# Patient Record
Sex: FEMALE | Race: WHITE | Employment: FULL TIME | URBAN - METROPOLITAN AREA
[De-identification: names, ages, dates, MRNs, and addresses within clinical notes are randomized per-mention and may not be internally consistent; named-entity substitution may affect disease eponyms.]

---

## 2017-07-28 ENCOUNTER — OFFICE VISIT (OUTPATIENT)
Dept: FAMILY MEDICINE CLINIC | Age: 37
End: 2017-07-28
Payer: COMMERCIAL

## 2017-07-28 VITALS
DIASTOLIC BLOOD PRESSURE: 80 MMHG | WEIGHT: 157.4 LBS | SYSTOLIC BLOOD PRESSURE: 110 MMHG | HEIGHT: 67 IN | OXYGEN SATURATION: 99 % | BODY MASS INDEX: 24.71 KG/M2 | RESPIRATION RATE: 16 BRPM | HEART RATE: 68 BPM

## 2017-07-28 DIAGNOSIS — J30.89 ENVIRONMENTAL AND SEASONAL ALLERGIES: ICD-10-CM

## 2017-07-28 DIAGNOSIS — F43.21 ADJUSTMENT DISORDER WITH DEPRESSED MOOD: ICD-10-CM

## 2017-07-28 PROCEDURE — 99213 OFFICE O/P EST LOW 20 MIN: CPT | Performed by: FAMILY MEDICINE

## 2017-07-28 RX ORDER — DESLORATADINE 5 MG/1
TABLET ORAL
Qty: 90 TABLET | Refills: 3 | Status: SHIPPED | OUTPATIENT
Start: 2017-07-28 | End: 2019-08-07 | Stop reason: SDUPTHER

## 2017-07-28 RX ORDER — BUPROPION HYDROCHLORIDE 150 MG/1
150 TABLET ORAL EVERY MORNING
Qty: 90 TABLET | Refills: 3 | Status: SHIPPED | OUTPATIENT
Start: 2017-07-28 | End: 2018-08-14 | Stop reason: SDUPTHER

## 2017-07-28 ASSESSMENT — ENCOUNTER SYMPTOMS
GASTROINTESTINAL NEGATIVE: 1
RESPIRATORY NEGATIVE: 1

## 2018-01-19 ENCOUNTER — HOSPITAL ENCOUNTER (OUTPATIENT)
Dept: WOMENS IMAGING | Age: 38
Discharge: HOME | End: 2018-01-19
Payer: COMMERCIAL

## 2018-01-19 ENCOUNTER — HOSPITAL ENCOUNTER (OUTPATIENT)
Dept: MRI IMAGING | Age: 38
Discharge: HOME | End: 2018-01-19
Payer: COMMERCIAL

## 2018-01-19 DIAGNOSIS — Z15.01 GENETIC SUSCEPTIBILITY TO MALIGNANT NEOPLASM OF BREAST: ICD-10-CM

## 2018-01-19 DIAGNOSIS — N60.19 FIBROCYSTIC BREAST DISEASE (FCBD), UNSPECIFIED LATERALITY: ICD-10-CM

## 2018-01-19 DIAGNOSIS — Z12.31 VISIT FOR SCREENING MAMMOGRAM: ICD-10-CM

## 2018-01-19 DIAGNOSIS — Z80.3 FAMILY HISTORY OF BREAST CANCER: ICD-10-CM

## 2018-01-19 PROCEDURE — 6360000004 HC RX CONTRAST MEDICATION: Performed by: NURSE PRACTITIONER

## 2018-01-19 PROCEDURE — A9579 GAD-BASE MR CONTRAST NOS,1ML: HCPCS | Performed by: NURSE PRACTITIONER

## 2018-01-19 PROCEDURE — 77067 SCR MAMMO BI INCL CAD: CPT

## 2018-01-19 PROCEDURE — 0159T MRI BREAST BILATERAL W WO CONTRAST: CPT

## 2018-01-19 RX ADMIN — GADOTERIDOL 15 ML: 279.3 INJECTION, SOLUTION INTRAVENOUS at 09:55

## 2018-03-01 DIAGNOSIS — J30.89 ENVIRONMENTAL AND SEASONAL ALLERGIES: ICD-10-CM

## 2018-03-01 RX ORDER — DESLORATADINE 5 MG/1
TABLET ORAL
Qty: 90 TABLET | Refills: 3 | Status: SHIPPED | OUTPATIENT
Start: 2018-03-01 | End: 2019-08-07 | Stop reason: SDUPTHER

## 2018-08-14 ENCOUNTER — OFFICE VISIT (OUTPATIENT)
Dept: FAMILY MEDICINE CLINIC | Age: 38
End: 2018-08-14
Payer: COMMERCIAL

## 2018-08-14 VITALS
HEART RATE: 90 BPM | OXYGEN SATURATION: 99 % | RESPIRATION RATE: 13 BRPM | SYSTOLIC BLOOD PRESSURE: 116 MMHG | HEIGHT: 67 IN | DIASTOLIC BLOOD PRESSURE: 72 MMHG | BODY MASS INDEX: 24.37 KG/M2 | WEIGHT: 155.3 LBS

## 2018-08-14 DIAGNOSIS — R79.89 LOW TSH LEVEL: ICD-10-CM

## 2018-08-14 DIAGNOSIS — Z00.00 WELL ADULT HEALTH CHECK: Primary | ICD-10-CM

## 2018-08-14 PROCEDURE — 99395 PREV VISIT EST AGE 18-39: CPT | Performed by: FAMILY MEDICINE

## 2018-08-14 RX ORDER — BUPROPION HYDROCHLORIDE 150 MG/1
150 TABLET ORAL EVERY MORNING
Qty: 90 TABLET | Refills: 3 | Status: SHIPPED | OUTPATIENT
Start: 2018-08-14 | End: 2018-09-06 | Stop reason: SDUPTHER

## 2018-08-14 ASSESSMENT — ENCOUNTER SYMPTOMS
GASTROINTESTINAL NEGATIVE: 1
RESPIRATORY NEGATIVE: 1

## 2018-08-14 NOTE — PROGRESS NOTES
Visit Information    Have you changed or started any medications since your last visit including any over-the-counter medicines, vitamins, or herbal medicines? no   Are you having any side effects from any of your medications? -  no  Have you stopped taking any of your medications? Is so, why? -  no    Have you seen any other physician or provider since your last visit? Yes - Records Requested  Have you had any other diagnostic tests since your last visit? Yes - Records Obtained  Have you been seen in the emergency room and/or had an admission to a hospital since we last saw you? No  Have you had your routine dental cleaning in the past 6 months? yes - 5/18    Have you activated your [facility] account? If not, what are your barriers?  Yes     Patient Care Team:  Santosh Lin DO as PCP - General (Family Medicine)    Medical History Review  Past Medical, Family, and Social History reviewed and does contribute to the patient presenting condition    Health Maintenance   Topic Date Due    HIV screen  10/25/1994    DTaP/Tdap/Td vaccine (1 - Tdap) 10/25/1998    Flu vaccine (1) 09/01/2018    Cervical cancer screen  12/19/2019

## 2018-08-14 NOTE — PROGRESS NOTES
Subjective:      Patient ID: Roselia Soares is a 44 y.o. female. HPI:    Chief Complaint   Patient presents with    Annual Exam     wellness     Medication Refill     Annual wellness. Doing great, no concerns. Well female UTD. Mood controlled on the Wellbutrin. No acute concerns today. Patient Active Problem List   Diagnosis    Adjustment disorder with depressed mood    Environmental and seasonal allergies     Past Surgical History:   Procedure Laterality Date    ENDOMETRIAL ABLATION  2909,8689    KNEE SURGERY  1996    KNEE SURGERY  1196    TYMPANOSTOMY TUBE PLACEMENT  1981     Prior to Admission medications    Medication Sig Start Date End Date Taking? Authorizing Provider   buPROPion (WELLBUTRIN XL) 150 MG extended release tablet Take 1 tablet by mouth every morning 8/14/18  Yes Efra Huerta, DO   desloratadine (CLARINEX) 5 MG tablet TAKE 1 TABLET BY MOUTH EVERY DAY 3/1/18  Yes Efra Huerta, DO   desloratadine (CLARINEX) 5 MG tablet TAKE 1 TABLET BY MOUTH EVERY DAY 7/28/17  Yes Efra Huerta, DO   Naproxen Sodium (ALEVE) 220 MG CAPS Take by mouth   Yes Historical Provider, MD   MULTIPLE VITAMIN PO Take  by mouth. Yes Historical Provider, MD         Review of Systems   Constitutional: Negative. HENT: Negative. Respiratory: Negative. Cardiovascular: Negative. Gastrointestinal: Negative. Musculoskeletal: Negative. All other systems reviewed and are negative. Objective:   Physical Exam   Constitutional: She is oriented to person, place, and time. She appears well-developed and well-nourished. HENT:   Head: Normocephalic and atraumatic. Right Ear: Tympanic membrane normal.   Left Ear: Tympanic membrane normal.   Mouth/Throat: Oropharynx is clear and moist and mucous membranes are normal.   Cardiovascular: Normal rate, regular rhythm and normal heart sounds. No murmur heard.   Pulmonary/Chest: Effort normal and breath sounds normal. Abdominal: Soft. Bowel sounds are normal.   Musculoskeletal: She exhibits no edema. Neurological: She is alert and oriented to person, place, and time. Skin: Skin is warm and dry. Psychiatric: She has a normal mood and affect. Her behavior is normal.   Nursing note and vitals reviewed. Assessment:       Diagnosis Orders   1.  Well adult health check  Lipid Panel    Comprehensive Metabolic Panel    TSH with Reflex    CBC Auto Differential           Plan:      -  Healthy lifestyle discussed  -  Check labs, will call  -  Well female UTD  -  RTO annually        Peggy Merchant, DO

## 2018-08-16 LAB
ABSOLUTE BASO #: 0 K/UL (ref 0–0.1)
ABSOLUTE EOS #: 0.1 K/UL (ref 0.1–0.4)
ABSOLUTE LYMPH #: 1.2 K/UL (ref 0.8–5.2)
ABSOLUTE MONO #: 0.4 K/UL (ref 0.1–0.9)
ABSOLUTE NEUT #: 4 K/UL (ref 1.3–9.1)
ALBUMIN SERPL-MCNC: 4.4 G/DL (ref 3.5–5.2)
ALK PHOSPHATASE: 60 U/L (ref 30–101)
ALT SERPL-CCNC: 13 U/L (ref 5–40)
ANION GAP SERPL CALCULATED.3IONS-SCNC: 10 MEQ/L (ref 10–19)
AST SERPL-CCNC: 13 U/L (ref 9–40)
BASOPHILS RELATIVE PERCENT: 0.5 %
BILIRUB SERPL-MCNC: 0.5 MG/DL
BUN BLDV-MCNC: 11 MG/DL (ref 8–23)
CALCIUM SERPL-MCNC: 9.7 MG/DL (ref 8.5–10.5)
CHLORIDE BLD-SCNC: 104 MEQ/L (ref 95–107)
CHOLESTEROL/HDL RATIO: 2.4
CHOLESTEROL: 136 MG/DL
CO2: 27 MEQ/L (ref 19–31)
CREAT SERPL-MCNC: 0.7 MG/DL (ref 0.6–1.3)
EGFR AFRICAN AMERICAN: 127.4 ML/MIN/1.73 M2
EGFR IF NONAFRICAN AMERICAN: 109.9 ML/MIN/1.73 M2
EOSINOPHILS RELATIVE PERCENT: 0.9 %
GLUCOSE: 102 MG/DL (ref 70–99)
HCT VFR BLD CALC: 34.4 % (ref 36–48)
HDLC SERPL-MCNC: 56 MG/DL
HEMOGLOBIN: 11.7 G/DL (ref 12–16)
LDL CHOLESTEROL CALCULATED: 71 MG/DL
LDL/HDL RATIO: 1.3
LYMPHOCYTE %: 20.3 %
MCH RBC QN AUTO: 27.3 PG (ref 27–34)
MCHC RBC AUTO-ENTMCNC: 34 G/DL (ref 31–36)
MCV RBC AUTO: 80.2 FL (ref 80–100)
MONOCYTES # BLD: 7.2 %
NEUTROPHILS RELATIVE PERCENT: 70.7 %
PDW BLD-RTO: 12.9 % (ref 10.8–14.8)
PLATELETS: 229 K/UL (ref 150–450)
POTASSIUM SERPL-SCNC: 4 MEQ/L (ref 3.5–5.4)
RBC: 4.29 M/UL (ref 4–5.5)
SODIUM BLD-SCNC: 141 MEQ/L (ref 135–146)
T4 FREE: 1.03 NG/DL (ref 0.8–1.9)
TOTAL PROTEIN: 6.9 G/DL (ref 6.1–8.3)
TRIGL SERPL-MCNC: 46 MG/DL
TSH SERPL DL<=0.05 MIU/L-ACNC: 0.15 UIU/ML (ref 0.4–4.1)
VLDLC SERPL CALC-MCNC: 9 MG/DL
WBC: 5.7 K/UL (ref 3.7–10.8)

## 2018-09-06 RX ORDER — BUPROPION HYDROCHLORIDE 150 MG/1
150 TABLET ORAL EVERY MORNING
Qty: 90 TABLET | Refills: 3 | Status: SHIPPED | OUTPATIENT
Start: 2018-09-06 | End: 2019-08-07 | Stop reason: SDUPTHER

## 2018-09-15 LAB
T4 FREE: 1.05 NG/DL (ref 0.8–1.9)
THYROID PEROXIDASE ANTIBODY: 1 IU/ML
TSH SERPL DL<=0.05 MIU/L-ACNC: 0.15 UIU/ML (ref 0.4–4.1)

## 2019-01-22 ENCOUNTER — HOSPITAL ENCOUNTER (OUTPATIENT)
Dept: WOMENS IMAGING | Age: 39
Discharge: HOME | End: 2019-01-22
Payer: COMMERCIAL

## 2019-01-22 DIAGNOSIS — Z12.31 VISIT FOR SCREENING MAMMOGRAM: ICD-10-CM

## 2019-01-22 PROCEDURE — 77063 BREAST TOMOSYNTHESIS BI: CPT

## 2019-01-23 ENCOUNTER — HOSPITAL ENCOUNTER (OUTPATIENT)
Dept: WOMENS IMAGING | Age: 39
Discharge: HOME | End: 2019-01-23
Payer: COMMERCIAL

## 2019-01-23 DIAGNOSIS — N63.0 BREAST NODULE: ICD-10-CM

## 2019-01-23 PROCEDURE — 76642 ULTRASOUND BREAST LIMITED: CPT

## 2019-01-30 ENCOUNTER — HOSPITAL ENCOUNTER (OUTPATIENT)
Dept: WOMENS IMAGING | Age: 39
Discharge: HOME | End: 2019-01-30
Payer: COMMERCIAL

## 2019-01-30 DIAGNOSIS — N63.20 LEFT BREAST MASS: ICD-10-CM

## 2019-01-30 PROCEDURE — 19083 BX BREAST 1ST LESION US IMAG: CPT

## 2019-01-30 PROCEDURE — A4648 IMPLANTABLE TISSUE MARKER: HCPCS

## 2019-01-30 PROCEDURE — 2709999900 HC NON-CHARGEABLE SUPPLY

## 2019-01-30 PROCEDURE — 88305 TISSUE EXAM BY PATHOLOGIST: CPT

## 2019-01-30 PROCEDURE — 19084 BX BREAST ADD LESION US IMAG: CPT

## 2019-01-30 PROCEDURE — 77065 DX MAMMO INCL CAD UNI: CPT

## 2019-01-30 PROCEDURE — C1894 INTRO/SHEATH, NON-LASER: HCPCS

## 2019-08-01 ENCOUNTER — HOSPITAL ENCOUNTER (OUTPATIENT)
Dept: MRI IMAGING | Age: 39
Discharge: HOME | End: 2019-08-01
Payer: COMMERCIAL

## 2019-08-01 DIAGNOSIS — Z15.01 GENETIC SUSCEPTIBILITY TO MALIGNANT NEOPLASM OF BREAST: ICD-10-CM

## 2019-08-01 DIAGNOSIS — N60.19 FIBROCYSTIC BREAST DISEASE (FCBD), UNSPECIFIED LATERALITY: ICD-10-CM

## 2019-08-01 DIAGNOSIS — Z91.89 INCREASED RISK OF BREAST CANCER: ICD-10-CM

## 2019-08-01 DIAGNOSIS — Z80.3 FAMILY HISTORY OF BREAST CANCER IN MOTHER: ICD-10-CM

## 2019-08-01 PROCEDURE — A9579 GAD-BASE MR CONTRAST NOS,1ML: HCPCS | Performed by: NURSE PRACTITIONER

## 2019-08-01 PROCEDURE — 6360000004 HC RX CONTRAST MEDICATION: Performed by: NURSE PRACTITIONER

## 2019-08-01 PROCEDURE — 77049 MRI BREAST C-+ W/CAD BI: CPT

## 2019-08-01 RX ADMIN — GADOTERIDOL 15 ML: 279.3 INJECTION, SOLUTION INTRAVENOUS at 08:00

## 2019-08-07 ENCOUNTER — OFFICE VISIT (OUTPATIENT)
Dept: FAMILY MEDICINE CLINIC | Age: 39
End: 2019-08-07
Payer: COMMERCIAL

## 2019-08-07 VITALS
WEIGHT: 134.8 LBS | HEART RATE: 84 BPM | BODY MASS INDEX: 21.16 KG/M2 | RESPIRATION RATE: 20 BRPM | DIASTOLIC BLOOD PRESSURE: 64 MMHG | HEIGHT: 67 IN | SYSTOLIC BLOOD PRESSURE: 102 MMHG

## 2019-08-07 DIAGNOSIS — Z00.00 WELL ADULT HEALTH CHECK: Primary | ICD-10-CM

## 2019-08-07 DIAGNOSIS — J30.89 ENVIRONMENTAL AND SEASONAL ALLERGIES: ICD-10-CM

## 2019-08-07 PROCEDURE — 99395 PREV VISIT EST AGE 18-39: CPT | Performed by: FAMILY MEDICINE

## 2019-08-07 RX ORDER — BUPROPION HYDROCHLORIDE 150 MG/1
150 TABLET ORAL EVERY MORNING
Qty: 90 TABLET | Refills: 3 | Status: SHIPPED | OUTPATIENT
Start: 2019-08-07 | End: 2020-11-30 | Stop reason: SDUPTHER

## 2019-08-07 RX ORDER — DESLORATADINE 5 MG/1
TABLET ORAL
Qty: 90 TABLET | Refills: 3 | Status: SHIPPED | OUTPATIENT
Start: 2019-08-07 | End: 2019-08-08

## 2019-08-07 ASSESSMENT — PATIENT HEALTH QUESTIONNAIRE - PHQ9
2. FEELING DOWN, DEPRESSED OR HOPELESS: 0
1. LITTLE INTEREST OR PLEASURE IN DOING THINGS: 0
SUM OF ALL RESPONSES TO PHQ QUESTIONS 1-9: 0
SUM OF ALL RESPONSES TO PHQ QUESTIONS 1-9: 0
SUM OF ALL RESPONSES TO PHQ9 QUESTIONS 1 & 2: 0

## 2019-08-07 ASSESSMENT — ENCOUNTER SYMPTOMS
RESPIRATORY NEGATIVE: 1
GASTROINTESTINAL NEGATIVE: 1

## 2019-08-08 ENCOUNTER — TELEPHONE (OUTPATIENT)
Dept: FAMILY MEDICINE CLINIC | Age: 39
End: 2019-08-08

## 2019-08-08 RX ORDER — LORATADINE 10 MG/1
10 TABLET ORAL DAILY
Qty: 90 TABLET | Refills: 3 | Status: SHIPPED | OUTPATIENT
Start: 2019-08-08 | End: 2019-09-07

## 2020-11-30 ENCOUNTER — VIRTUAL VISIT (OUTPATIENT)
Dept: FAMILY MEDICINE CLINIC | Age: 40
End: 2020-11-30
Payer: COMMERCIAL

## 2020-11-30 PROCEDURE — 99396 PREV VISIT EST AGE 40-64: CPT | Performed by: FAMILY MEDICINE

## 2020-11-30 RX ORDER — BUPROPION HYDROCHLORIDE 150 MG/1
150 TABLET ORAL EVERY MORNING
Qty: 90 TABLET | Refills: 3 | Status: SHIPPED | OUTPATIENT
Start: 2020-11-30 | End: 2021-11-24 | Stop reason: SDUPTHER

## 2020-11-30 ASSESSMENT — ENCOUNTER SYMPTOMS
GASTROINTESTINAL NEGATIVE: 1
RESPIRATORY NEGATIVE: 1

## 2020-11-30 NOTE — PROGRESS NOTES
Subjective:      Patient ID: Kate Deal is a 38 y.o. female. HPI:    Chief Complaint   Patient presents with    Annual Exam       Kate Deal (:  1980) has requested an audio/video evaluation for the following concern(s): Annual eval.  Doing well overall. Continues to take the Wellbutrin daily, working very well. No side effects. No acute concerns for me today. Patient Active Problem List   Diagnosis    Adjustment disorder with depressed mood    Environmental and seasonal allergies     Past Surgical History:   Procedure Laterality Date    ENDOMETRIAL ABLATION  7983,6408    KNEE SURGERY      KNEE SURGERY  1196    TYMPANOSTOMY TUBE PLACEMENT       Prior to Admission medications    Medication Sig Start Date End Date Taking? Authorizing Provider   buPROPion (WELLBUTRIN XL) 150 MG extended release tablet Take 1 tablet by mouth every morning 20  Yes Jaime Coates DO   Naproxen Sodium (ALEVE) 220 MG CAPS Take by mouth    Historical Provider, MD   MULTIPLE VITAMIN PO Take  by mouth. Historical Provider, MD       Review of Systems   Constitutional: Negative. HENT: Negative. Respiratory: Negative. Cardiovascular: Negative. Gastrointestinal: Negative. Musculoskeletal: Negative. All other systems reviewed and are negative. Objective:   Physical Exam  Constitutional:       General: She is not in acute distress. Appearance: Normal appearance. She is well-developed. She is not ill-appearing. HENT:      Head: Normocephalic and atraumatic. Right Ear: External ear normal.      Left Ear: External ear normal.   Eyes:      Conjunctiva/sclera: Conjunctivae normal.   Pulmonary:      Effort: Pulmonary effort is normal. No respiratory distress. Skin:     Findings: No rash (on exposed surfaces). Neurological:      Mental Status: She is alert and oriented to person, place, and time.    Psychiatric:         Mood and Affect: Mood normal.         Behavior: Behavior normal.         Thought Content: Thought content normal.         Judgment: Judgment normal.         Assessment:       Diagnosis Orders   1. Well adult health check     2. Adjustment disorder with depressed mood             Plan:      -  Healthy lifestyle discussed  -  Doing well on Wellbutrin, refills sent  -  RTO annually    Due to this being a TeleHealth encounter (During XYKGY-[id number] public [facility] emergency), evaluation of the following organ systems was limited: Vitals/Constitutional/EENT/Resp/CV/GI//MS/Neuro/Skin/Heme-Lymph-Imm. Pursuant to the emergency declaration under the [address] [place] [address], [address] [place] [address] authority and the [employer] Act, this Virtual Visit was conducted with patient's (and/or legal guardian's) consent, to reduce the patient's risk of exposure to COVID-19 and provide necessary medical care. The patient (and/or legal guardian) has also been advised to contact this office for worsening conditions or problems, and seek emergency medical treatment and/or call 911 if deemed necessary. Patient identification was verified at the start of the visit: Yes    Total time spent for this encounter: Not billed by time    Services were provided through a video synchronous discussion virtually to substitute for in-person clinic visit. Patient and provider were located at their individual homes.           Peter Britt DO

## 2021-07-28 ENCOUNTER — OFFICE VISIT (OUTPATIENT)
Dept: FAMILY MEDICINE CLINIC | Age: 41
End: 2021-07-28
Payer: COMMERCIAL

## 2021-07-28 VITALS
WEIGHT: 148.6 LBS | RESPIRATION RATE: 20 BRPM | SYSTOLIC BLOOD PRESSURE: 122 MMHG | BODY MASS INDEX: 22.52 KG/M2 | DIASTOLIC BLOOD PRESSURE: 72 MMHG | HEART RATE: 96 BPM | HEIGHT: 68 IN

## 2021-07-28 DIAGNOSIS — Z00.00 WELL ADULT HEALTH CHECK: Primary | ICD-10-CM

## 2021-07-28 DIAGNOSIS — F43.21 ADJUSTMENT DISORDER WITH DEPRESSED MOOD: ICD-10-CM

## 2021-07-28 PROCEDURE — 99396 PREV VISIT EST AGE 40-64: CPT | Performed by: FAMILY MEDICINE

## 2021-07-28 SDOH — ECONOMIC STABILITY: FOOD INSECURITY: WITHIN THE PAST 12 MONTHS, THE FOOD YOU BOUGHT JUST DIDN'T LAST AND YOU DIDN'T HAVE MONEY TO GET MORE.: NEVER TRUE

## 2021-07-28 SDOH — ECONOMIC STABILITY: FOOD INSECURITY: WITHIN THE PAST 12 MONTHS, YOU WORRIED THAT YOUR FOOD WOULD RUN OUT BEFORE YOU GOT MONEY TO BUY MORE.: NEVER TRUE

## 2021-07-28 ASSESSMENT — PATIENT HEALTH QUESTIONNAIRE - PHQ9
SUM OF ALL RESPONSES TO PHQ QUESTIONS 1-9: 0
1. LITTLE INTEREST OR PLEASURE IN DOING THINGS: 0
SUM OF ALL RESPONSES TO PHQ QUESTIONS 1-9: 0
SUM OF ALL RESPONSES TO PHQ9 QUESTIONS 1 & 2: 0
2. FEELING DOWN, DEPRESSED OR HOPELESS: 0
SUM OF ALL RESPONSES TO PHQ QUESTIONS 1-9: 0

## 2021-07-28 ASSESSMENT — ENCOUNTER SYMPTOMS
RESPIRATORY NEGATIVE: 1
GASTROINTESTINAL NEGATIVE: 1

## 2021-07-28 ASSESSMENT — SOCIAL DETERMINANTS OF HEALTH (SDOH): HOW HARD IS IT FOR YOU TO PAY FOR THE VERY BASICS LIKE FOOD, HOUSING, MEDICAL CARE, AND HEATING?: NOT HARD AT ALL

## 2021-07-28 NOTE — PROGRESS NOTES
2021    Josue Boyd (:  1980) is a 38 y.o. female, here for a preventive medicine evaluation. Chief Complaint   Patient presents with    Annual Exam     Annual wellness. Doing well, no concerns. Doing well on the Wellbutrin. Patient Active Problem List   Diagnosis    Adjustment disorder with depressed mood    Environmental and seasonal allergies       Review of Systems   Constitutional: Negative. HENT: Negative. Respiratory: Negative. Cardiovascular: Negative. Gastrointestinal: Negative. Musculoskeletal: Negative. All other systems reviewed and are negative. Prior to Visit Medications    Medication Sig Taking? Authorizing Provider   buPROPion (WELLBUTRIN XL) 150 MG extended release tablet Take 1 tablet by mouth every morning Yes Kwabena Gallardo DO   Naproxen Sodium (ALEVE) 220 MG CAPS Take by mouth Yes Historical Provider, MD   MULTIPLE VITAMIN PO Take  by mouth.    Yes Historical Provider, MD        Allergies   Allergen Reactions    Latex Rash       Past Medical History:   Diagnosis Date    Thyroid activity decreased        Past Surgical History:   Procedure Laterality Date    ENDOMETRIAL ABLATION  8060,6068    KNEE SURGERY      KNEE SURGERY  0    TYMPANOSTOMY TUBE PLACEMENT         Social History     Socioeconomic History    Marital status:      Spouse name: Not on file    Number of children: Not on file    Years of education: Not on file    Highest education level: Not on file   Occupational History    Not on file   Tobacco Use    Smoking status: Never Smoker    Smokeless tobacco: Never Used   Substance and Sexual Activity    Alcohol use: No    Drug use: No    Sexual activity: Not on file   Other Topics Concern    Not on file   Social History Narrative    Not on file     Social Determinants of Health     Financial Resource Strain: Low Risk     Difficulty of Paying Living Expenses: Not hard at all   Food Insecurity: No Food Insecurity    Worried About Running Out of Food in the Last Year: Never true    Ran Out of Food in the Last Year: Never true   Transportation Needs:     Lack of Transportation (Medical):  Lack of Transportation (Non-Medical):    Physical Activity:     Days of Exercise per Week:     Minutes of Exercise per Session:    Stress:     Feeling of Stress :    Social Connections:     Frequency of Communication with Friends and Family:     Frequency of Social Gatherings with Friends and Family:     Attends Worship Services:     Active Member of Clubs or Organizations:     Attends Club or Organization Meetings:     Marital Status:    Intimate Partner Violence:     Fear of Current or Ex-Partner:     Emotionally Abused:     Physically Abused:     Sexually Abused:         Family History   Problem Relation Age of Onset    Cancer Mother         breast    Breast Cancer Mother 39    Diabetes Father     Diabetes Maternal Grandmother     Asthma Maternal Grandmother     Diabetes Maternal Grandfather     Heart Disease Maternal Grandfather     Diabetes Paternal Grandmother     Diabetes Paternal Grandfather     Heart Disease Paternal Grandfather     Ovarian Cancer Neg Hx        ADVANCE DIRECTIVE: N, <no information>    Vitals:    07/28/21 1034   BP: 122/72   Site: Left Upper Arm   Position: Sitting   Cuff Size: Small Adult   Pulse: 96   Resp: 20   Weight: 148 lb 9.6 oz (67.4 kg)   Height: 5' 7.5\" (1.715 m)     Estimated body mass index is 22.93 kg/m² as calculated from the following:    Height as of this encounter: 5' 7.5\" (1.715 m). Weight as of this encounter: 148 lb 9.6 oz (67.4 kg). Physical Exam  Vitals and nursing note reviewed. Constitutional:       General: She is not in acute distress. Appearance: Normal appearance. She is well-developed. HENT:      Head: Normocephalic and atraumatic.       Right Ear: Tympanic membrane normal.      Left Ear: Tympanic membrane normal.   Eyes:      Conjunctiva/sclera: Conjunctivae normal.   Cardiovascular:      Rate and Rhythm: Normal rate and regular rhythm. Heart sounds: Normal heart sounds. No murmur heard. Pulmonary:      Effort: Pulmonary effort is normal.      Breath sounds: Normal breath sounds. No wheezing, rhonchi or rales. Abdominal:      General: There is no distension. Musculoskeletal:      Cervical back: Neck supple. Skin:     General: Skin is warm and dry. Findings: No rash (on exposed surfaces). Neurological:      General: No focal deficit present. Mental Status: She is alert. Psychiatric:         Attention and Perception: Attention normal.         Mood and Affect: Mood normal.         Speech: Speech normal.         Behavior: Behavior normal. Behavior is cooperative. Thought Content: Thought content normal.         Judgment: Judgment normal.         No flowsheet data found. Lab Results   Component Value Date    CHOL 136 08/15/2018    CHOL 127 12/20/2016    TRIG 46 08/15/2018    TRIG 43 12/20/2016    HDL 56 08/15/2018    HDL 53 12/20/2016    LDLCALC 71 08/15/2018    LDLCALC 65 12/20/2016    GLUCOSE 102 08/15/2018    LABA1C 5.6 12/14/2012       The 10-year ASCVD risk score (Jigna Dumont, et al., 2013) is: 0.3%    Values used to calculate the score:      Age: 38 years      Sex: Female      Is Non- : No      Diabetic: No      Tobacco smoker: No      Systolic Blood Pressure: 577 mmHg      Is BP treated: No      HDL Cholesterol: 56 mg/dL      Total Cholesterol: 136 mg/dL      There is no immunization history on file for this patient.     Health Maintenance   Topic Date Due    Hepatitis C screen  Never done    COVID-19 Vaccine (1) Never done    HIV screen  Never done    DTaP/Tdap/Td vaccine (1 - Tdap) Never done    Cervical cancer screen  12/19/2019    Breast cancer screen  01/30/2020    Flu vaccine (1) 09/01/2021    Lipid screen  08/15/2023    Hepatitis A vaccine  Aged Out    Hepatitis B vaccine  Aged Out    Hib vaccine  Aged Out    Meningococcal (ACWY) vaccine  Aged Out    Pneumococcal 0-64 years Vaccine  Aged Out          ASSESSMENT/PLAN:  1. Well adult health check  2. Adjustment disorder with depressed mood    -  Healthy lifestyle discussed  -  Well female UTD  -  Continue Wellbutrin    Return in about 1 year (around 7/28/2022). An electronic signature was used to authenticate this note.     --Madhu Stanton DO on 7/28/2021 at 11:19 AM

## 2021-11-27 RX ORDER — BUPROPION HYDROCHLORIDE 150 MG/1
150 TABLET ORAL EVERY MORNING
Qty: 90 TABLET | Refills: 3 | Status: SHIPPED | OUTPATIENT
Start: 2021-11-27 | End: 2022-07-13 | Stop reason: SDUPTHER

## 2022-07-13 ENCOUNTER — OFFICE VISIT (OUTPATIENT)
Dept: FAMILY MEDICINE CLINIC | Age: 42
End: 2022-07-13
Payer: COMMERCIAL

## 2022-07-13 VITALS
HEART RATE: 72 BPM | HEIGHT: 67 IN | SYSTOLIC BLOOD PRESSURE: 116 MMHG | DIASTOLIC BLOOD PRESSURE: 64 MMHG | BODY MASS INDEX: 22.82 KG/M2 | RESPIRATION RATE: 12 BRPM | WEIGHT: 145.4 LBS

## 2022-07-13 DIAGNOSIS — Z00.00 WELL ADULT HEALTH CHECK: Primary | ICD-10-CM

## 2022-07-13 DIAGNOSIS — F43.21 ADJUSTMENT DISORDER WITH DEPRESSED MOOD: ICD-10-CM

## 2022-07-13 PROCEDURE — 99396 PREV VISIT EST AGE 40-64: CPT | Performed by: FAMILY MEDICINE

## 2022-07-13 RX ORDER — BUPROPION HYDROCHLORIDE 150 MG/1
150 TABLET ORAL EVERY MORNING
Qty: 90 TABLET | Refills: 3 | Status: SHIPPED | OUTPATIENT
Start: 2022-07-13

## 2022-07-13 ASSESSMENT — PATIENT HEALTH QUESTIONNAIRE - PHQ9
SUM OF ALL RESPONSES TO PHQ QUESTIONS 1-9: 0
SUM OF ALL RESPONSES TO PHQ9 QUESTIONS 1 & 2: 0
SUM OF ALL RESPONSES TO PHQ QUESTIONS 1-9: 0
1. LITTLE INTEREST OR PLEASURE IN DOING THINGS: 0
SUM OF ALL RESPONSES TO PHQ QUESTIONS 1-9: 0
SUM OF ALL RESPONSES TO PHQ QUESTIONS 1-9: 0
2. FEELING DOWN, DEPRESSED OR HOPELESS: 0

## 2022-07-13 ASSESSMENT — ENCOUNTER SYMPTOMS
GASTROINTESTINAL NEGATIVE: 1
RESPIRATORY NEGATIVE: 1

## 2022-07-13 NOTE — PROGRESS NOTES
2022    Linn Boyd (:  1980) is a 42 y.o. female, here for a preventive medicine evaluation. Chief Complaint   Patient presents with    Annual Exam     Annual eval.    Doing well on the Wellbutrin. No concerns. Patient Active Problem List   Diagnosis    Adjustment disorder with depressed mood    Environmental and seasonal allergies       Review of Systems   Constitutional: Negative. HENT: Negative. Respiratory: Negative. Cardiovascular: Negative. Gastrointestinal: Negative. Musculoskeletal: Negative. All other systems reviewed and are negative. Prior to Visit Medications    Medication Sig Taking? Authorizing Provider   buPROPion (WELLBUTRIN XL) 150 MG extended release tablet Take 1 tablet by mouth every morning Yes Evan Bodily, DO   Naproxen Sodium (ALEVE) 220 MG CAPS Take by mouth Yes Historical Provider, MD   MULTIPLE VITAMIN PO Take  by mouth.    Yes Historical Provider, MD        Allergies   Allergen Reactions    Latex Rash       Past Medical History:   Diagnosis Date    Thyroid activity decreased        Past Surgical History:   Procedure Laterality Date    ENDOMETRIAL ABLATION  0237,4706    KNEE SURGERY      KNEE SURGERY  0    TYMPANOSTOMY TUBE PLACEMENT         Social History     Socioeconomic History    Marital status:      Spouse name: Not on file    Number of children: Not on file    Years of education: Not on file    Highest education level: Not on file   Occupational History    Not on file   Tobacco Use    Smoking status: Never Smoker    Smokeless tobacco: Never Used   Substance and Sexual Activity    Alcohol use: No    Drug use: No    Sexual activity: Not on file   Other Topics Concern    Not on file   Social History Narrative    Not on file     Social Determinants of Health     Financial Resource Strain: Low Risk     Difficulty of Paying Living Expenses: Not hard at all   Food Insecurity: No Food Insecurity    Worried About Running Out of Food in the Last Year: Never true    [place] of Food in the Last Year: Never true   Transportation Needs:     Lack of Transportation (Medical): Not on file    Lack of Transportation (Non-Medical): Not on file   Physical Activity:     Days of Exercise per Week: Not on file    Minutes of Exercise per Session: Not on file   Stress:     Feeling of Stress : Not on file   Social Connections:     Frequency of Communication with Friends and Family: Not on file    Frequency of Social Gatherings with Friends and Family: Not on file    Attends Sikhism Services: Not on file    Active Member of [address] [place] [address] [employer] or Organizations: Not on file    Attends Club or Organization Meetings: Not on file    Marital Status: Not on file   Intimate Partner Violence:     Fear of Current or Ex-Partner: Not on file    Emotionally Abused: Not on file    Physically Abused: Not on file    Sexually Abused: Not on file   Housing Stability:     Unable to Pay for Housing in the Last Year: Not on file    Number of [place] in the Last Year: Not on file    Unstable Housing in the Last Year: Not on file        Family History   Problem Relation Age of Onset    Cancer Mother         breast    Breast Cancer Mother 39    Diabetes Father     Diabetes Maternal Grandmother     Asthma Maternal Grandmother     Diabetes Maternal Grandfather     Heart Disease Maternal Grandfather     Diabetes Paternal Grandmother     Diabetes Paternal Grandfather     Heart Disease Paternal Grandfather     Ovarian Cancer Neg Hx        ADVANCE DIRECTIVE: N, <no information>    Vitals:    07/13/22 0902   BP: 116/64   Site: Left Upper Arm   Position: Sitting   Cuff Size: Medium Adult   Pulse: 72   Resp: 12   Weight: 145 lb 6.4 oz (66 kg)   Height: 5' 7\" (1.702 m)     Estimated body mass index is 22.77 kg/m² as calculated from the following:    Height as of this encounter: 5' 7\" (1.702 m).     Weight as of this encounter: 145 lb 6.4 oz (66 kg). Physical Exam  Vitals and nursing note reviewed. Constitutional:       General: She is not in acute distress. Appearance: Normal appearance. She is well-developed. HENT:      Head: Normocephalic and atraumatic. Right Ear: Tympanic membrane normal.      Left Ear: Tympanic membrane normal.   Eyes:      Conjunctiva/sclera: Conjunctivae normal.   Cardiovascular:      Rate and Rhythm: Normal rate and regular rhythm. Heart sounds: Normal heart sounds. No murmur heard. Pulmonary:      Effort: Pulmonary effort is normal.      Breath sounds: Normal breath sounds. No wheezing, rhonchi or rales. Abdominal:      General: There is no distension. Musculoskeletal:      Cervical back: Neck supple. Skin:     General: Skin is warm and dry. Findings: No rash (on exposed surfaces). Neurological:      General: No focal deficit present. Mental Status: She is alert. Psychiatric:         Attention and Perception: Attention normal.         Mood and Affect: Mood normal.         Speech: Speech normal.         Behavior: Behavior normal. Behavior is cooperative. Thought Content: Thought content normal.         Judgment: Judgment normal.         No flowsheet data found. Lab Results   Component Value Date/Time    CHOL 136 08/15/2018 08:00 AM    CHOL 127 12/20/2016 08:06 AM    TRIG 46 08/15/2018 08:00 AM    TRIG 43 12/20/2016 08:06 AM    HDL 56 08/15/2018 08:00 AM    HDL 53 12/20/2016 08:06 AM    LDLCALC 71 08/15/2018 08:00 AM    LDLCALC 65 12/20/2016 08:06 AM    GLUCOSE 102 08/15/2018 08:00 AM    LABA1C 5.6 12/14/2012 12:00 AM       The ASCVD Risk score (Jeralene Brunner., et al., 2013) failed to calculate for the following reasons:    Cannot find a previous HDL lab    Cannot find a previous total cholesterol lab      There is no immunization history on file for this patient.     Health Maintenance   Topic Date Due    COVID-19 Vaccine (1) Never done    HIV screen  Never done    Hepatitis C screen  Never done    DTaP/Tdap/Td vaccine (1 - Tdap) Never done    Cervical cancer screen  12/19/2019    Breast cancer screen  01/30/2020    Depression Screen  07/28/2022    Flu vaccine (1) 09/01/2022    Lipids  08/15/2023    Hepatitis A vaccine  Aged Out    Hepatitis B vaccine  Aged Out    Hib vaccine  Aged Out    Meningococcal (ACWY) vaccine  Aged Out    Pneumococcal 0-64 years Vaccine  Aged Out       Assessment & Plan   Well adult health check  Adjustment disorder with depressed mood    -  Healthy lifestyle discussed  -  Doing well on Wellbutrin, refill sent    Return in about 1 year (around 7/13/2023) for Wellness.          --Gretchen Graf, DO

## 2023-08-29 RX ORDER — BUPROPION HYDROCHLORIDE 150 MG/1
TABLET ORAL
Qty: 90 TABLET | Refills: 3 | OUTPATIENT
Start: 2023-08-29

## 2023-09-20 ENCOUNTER — TELEMEDICINE (OUTPATIENT)
Dept: FAMILY MEDICINE CLINIC | Age: 43
End: 2023-09-20
Payer: COMMERCIAL

## 2023-09-20 DIAGNOSIS — F43.21 ADJUSTMENT DISORDER WITH DEPRESSED MOOD: ICD-10-CM

## 2023-09-20 DIAGNOSIS — Z00.00 WELL ADULT HEALTH CHECK: Primary | ICD-10-CM

## 2023-09-20 PROCEDURE — 99396 PREV VISIT EST AGE 40-64: CPT | Performed by: FAMILY MEDICINE

## 2023-09-20 RX ORDER — BUPROPION HYDROCHLORIDE 300 MG/1
300 TABLET ORAL EVERY MORNING
Qty: 90 TABLET | Refills: 3 | Status: SHIPPED | OUTPATIENT
Start: 2023-09-20

## 2023-09-20 SDOH — ECONOMIC STABILITY: HOUSING INSECURITY
IN THE LAST 12 MONTHS, WAS THERE A TIME WHEN YOU DID NOT HAVE A STEADY PLACE TO SLEEP OR SLEPT IN A SHELTER (INCLUDING NOW)?: NO

## 2023-09-20 SDOH — ECONOMIC STABILITY: FOOD INSECURITY: WITHIN THE PAST 12 MONTHS, THE FOOD YOU BOUGHT JUST DIDN'T LAST AND YOU DIDN'T HAVE MONEY TO GET MORE.: NEVER TRUE

## 2023-09-20 SDOH — ECONOMIC STABILITY: FOOD INSECURITY: WITHIN THE PAST 12 MONTHS, YOU WORRIED THAT YOUR FOOD WOULD RUN OUT BEFORE YOU GOT MONEY TO BUY MORE.: NEVER TRUE

## 2023-09-20 SDOH — ECONOMIC STABILITY: TRANSPORTATION INSECURITY
IN THE PAST 12 MONTHS, HAS LACK OF TRANSPORTATION KEPT YOU FROM MEETINGS, WORK, OR FROM GETTING THINGS NEEDED FOR DAILY LIVING?: NO

## 2023-09-20 SDOH — ECONOMIC STABILITY: INCOME INSECURITY: HOW HARD IS IT FOR YOU TO PAY FOR THE VERY BASICS LIKE FOOD, HOUSING, MEDICAL CARE, AND HEATING?: NOT HARD AT ALL

## 2023-09-20 ASSESSMENT — ENCOUNTER SYMPTOMS
GASTROINTESTINAL NEGATIVE: 1
RESPIRATORY NEGATIVE: 1

## 2023-09-20 NOTE — PROGRESS NOTES
[address] Abhinav Beckman (:  1980) is a Established patient, here for evaluation of the following:    Subjective   HPI:   Chief Complaint   Patient presents with    Annual Exam     Annual eval.    Doing well overall but does not feel the Wellbutrin works as well as it once did. Wearing out as the day goes on. Patient Active Problem List   Diagnosis    Adjustment disorder with depressed mood    Environmental and seasonal allergies     Past Surgical History:   Procedure Laterality Date    ENDOMETRIAL ABLATION  2783,7712    KNEE SURGERY      KNEE SURGERY  1196    TYMPANOSTOMY TUBE PLACEMENT       Social History     Tobacco Use    Smoking status: Never    Smokeless tobacco: Never   Substance Use Topics    Alcohol use: No    Drug use: No         Review of Systems   Constitutional: Negative. HENT: Negative. Respiratory: Negative. Cardiovascular: Negative. Gastrointestinal: Negative. Musculoskeletal: Negative. All other systems reviewed and are negative. Objective   Patient-Reported Vitals  No data recorded     Physical Exam  Constitutional:       General: She is not in acute distress. Appearance: Normal appearance. She is well-developed. She is not ill-appearing. HENT:      Head: Normocephalic and atraumatic. Right Ear: External ear normal.      Left Ear: External ear normal.   Eyes:      Conjunctiva/sclera: Conjunctivae normal.   Pulmonary:      Effort: Pulmonary effort is normal. No respiratory distress. Skin:     Findings: No rash (on exposed surfaces). Neurological:      Mental Status: She is alert and oriented to person, place, and time. Psychiatric:         Mood and Affect: Mood normal.         Behavior: Behavior normal.         Thought Content:  Thought content normal.         Judgment: Judgment normal.              Assessment & Plan   Below is the assessment and plan developed based on review of pertinent history, physical exam, labs, studies, and

## 2024-06-25 ENCOUNTER — OFFICE VISIT (OUTPATIENT)
Dept: FAMILY MEDICINE CLINIC | Age: 44
End: 2024-06-25
Payer: COMMERCIAL

## 2024-06-25 VITALS
HEART RATE: 76 BPM | BODY MASS INDEX: 22.72 KG/M2 | HEIGHT: 68 IN | SYSTOLIC BLOOD PRESSURE: 114 MMHG | DIASTOLIC BLOOD PRESSURE: 66 MMHG | RESPIRATION RATE: 16 BRPM | WEIGHT: 149.9 LBS

## 2024-06-25 DIAGNOSIS — Z00.00 WELL ADULT HEALTH CHECK: Primary | ICD-10-CM

## 2024-06-25 DIAGNOSIS — F43.21 ADJUSTMENT DISORDER WITH DEPRESSED MOOD: ICD-10-CM

## 2024-06-25 DIAGNOSIS — Z85.3 HISTORY OF BREAST CANCER: ICD-10-CM

## 2024-06-25 PROBLEM — D05.11 DUCTAL CARCINOMA IN SITU (DCIS) OF RIGHT BREAST: Status: ACTIVE | Noted: 2020-01-28

## 2024-06-25 PROCEDURE — 99396 PREV VISIT EST AGE 40-64: CPT | Performed by: FAMILY MEDICINE

## 2024-06-25 ASSESSMENT — PATIENT HEALTH QUESTIONNAIRE - PHQ9
1. LITTLE INTEREST OR PLEASURE IN DOING THINGS: NOT AT ALL
SUM OF ALL RESPONSES TO PHQ QUESTIONS 1-9: 0
SUM OF ALL RESPONSES TO PHQ9 QUESTIONS 1 & 2: 0
SUM OF ALL RESPONSES TO PHQ QUESTIONS 1-9: 0
2. FEELING DOWN, DEPRESSED OR HOPELESS: NOT AT ALL
SUM OF ALL RESPONSES TO PHQ QUESTIONS 1-9: 0
SUM OF ALL RESPONSES TO PHQ QUESTIONS 1-9: 0

## 2024-06-25 ASSESSMENT — ENCOUNTER SYMPTOMS
RESPIRATORY NEGATIVE: 1
GASTROINTESTINAL NEGATIVE: 1

## 2024-06-25 NOTE — PROGRESS NOTES
2024    Vandana Boyd (:  1980) is a 43 y.o. female, here for a preventive medicine evaluation.  Chief Complaint   Patient presents with    Annual Exam    Health Maintenance     Due for mammogram      Annual eval.      Doing well overall.    Wellbutrin working nicely.    Hx of breast cancer, follows closely with [facility].    Subjective   Patient Active Problem List   Diagnosis    Adjustment disorder with depressed mood    Environmental and seasonal allergies    Ductal carcinoma in situ (DCIS) of right breast       Review of Systems   Constitutional: Negative.    HENT: Negative.     Respiratory: Negative.     Cardiovascular: Negative.    Gastrointestinal: Negative.    Musculoskeletal: Negative.    All other systems reviewed and are negative.      Prior to Visit Medications    Medication Sig Taking? Authorizing Provider   buPROPion (WELLBUTRIN XL) 300 MG extended release tablet Take 1 tablet by mouth every morning Yes Da Kaplan,    Naproxen Sodium (ALEVE) 220 MG CAPS Take by mouth Yes Provider, MD Domingo   MULTIPLE VITAMIN PO Take  by mouth.   Yes ProviderDomingo MD        Allergies   Allergen Reactions    Latex Rash       Past Medical History:   Diagnosis Date    Thyroid activity decreased        Past Surgical History:   Procedure Laterality Date    ENDOMETRIAL ABLATION  ,    KNEE SURGERY  1996    KNEE SURGERY  1196    TYMPANOSTOMY TUBE PLACEMENT         Social History     Socioeconomic History    Marital status:      Spouse name: Not on file    Number of children: Not on file    Years of education: Not on file    Highest education level: Not on file   Occupational History    Not on file   Tobacco Use    Smoking status: Never    Smokeless tobacco: Never   Substance and Sexual Activity    Alcohol use: No    Drug use: No    Sexual activity: Not on file   Other Topics Concern    Not on file   Social History Narrative    Not on file     Social Determinants

## 2024-09-21 DIAGNOSIS — F43.21 ADJUSTMENT DISORDER WITH DEPRESSED MOOD: ICD-10-CM

## 2024-09-23 RX ORDER — BUPROPION HYDROCHLORIDE 300 MG/1
300 TABLET ORAL EVERY MORNING
Qty: 90 TABLET | Refills: 3 | Status: SHIPPED | OUTPATIENT
Start: 2024-09-23